# Patient Record
Sex: FEMALE | Race: WHITE
[De-identification: names, ages, dates, MRNs, and addresses within clinical notes are randomized per-mention and may not be internally consistent; named-entity substitution may affect disease eponyms.]

---

## 2018-09-13 ENCOUNTER — HOSPITAL ENCOUNTER (OUTPATIENT)
Dept: HOSPITAL 25 - OREAST | Age: 38
Discharge: HOME | End: 2018-09-13
Attending: ORTHOPAEDIC SURGERY
Payer: COMMERCIAL

## 2018-09-13 VITALS — SYSTOLIC BLOOD PRESSURE: 125 MMHG | DIASTOLIC BLOOD PRESSURE: 70 MMHG

## 2018-09-13 DIAGNOSIS — I10: ICD-10-CM

## 2018-09-13 DIAGNOSIS — E66.9: ICD-10-CM

## 2018-09-13 DIAGNOSIS — G56.21: Primary | ICD-10-CM

## 2018-09-13 DIAGNOSIS — M19.90: ICD-10-CM

## 2018-09-13 DIAGNOSIS — K21.9: ICD-10-CM

## 2018-09-13 PROCEDURE — 81025 URINE PREGNANCY TEST: CPT

## 2018-09-14 NOTE — OP
Operative Report - Blank





- Operative Report


Date of Operation: 09/14/18


Note: 





DATE OF OPERATION:  9/13/2018 - [facility]


 


YOB: 1980


 


SURGEON:  Jerman Tucker MD


 


ASSISTANT:  CARLITA Mondragon


 


ANESTHESIOLOGIST:  Dr. Armendariz


 


ANESTHESIA:  General.


 


PRE-OP DIAGNOSIS:  


1. Right cubital tunnel syndrome.


2. Right ulnar nerve compression at the wrist.


 


POST-OP DIAGNOSIS:  


1. Right cubital tunnel syndrome.


2. Right ulnar nerve compression at the wrist.


 


OPERATIVE PROCEDURE:  


1. Right cubital tunnel release with anterior transposition.


2. Right ulnar nerve decompression at the wrist.


 


INDICATIONS:  Deepa has progressive right ulnar nerve symptoms over the last 

several years. She has electrodiagnostic evidence of ulnar nerve changes at the 

elbow and clinically she has more numbness on the palmar aspect than on the 

dorsal aspect and some signs of compression at the wrist. We talked about risks 

and benefits.  She understands the risk of pain and persistent symptoms as well 

as wound problems. She wanted to proceed.


 


ESTIMATED BLOOD LOSS:  2 mL.


 


COMPLICATIONS:  None.


 


FINDINGS:  As expected.


 


DESCRIPTION OF PROCEDURE:  Ms. Reynoso was seen in the preoperative holding area.

  The correct side, site and the procedure were identified.  We came back to 

the operating room.  I anesthetized the operative area with 0.25% plain 

Marcaine. The arm was prepped and draped in usual fashion.  





The arm was exsanguinated with the Esmarch and the tourniquet was inflated to 

250 mmHg. I began by making a longitudinal incision in the typical for location 

for a carpal tunnel incision.  This was brought back in Brunner-type fashion 

across the ulnar aspect of the wrist.  Dissection was carried down and the 

ulnar neurovascular bundle was identified.  I then opened up the fascia 

overlying the entirety of Guyon's canal.  The motor branch was released and the 

subfascial layer was released as well.  Both the common digital nerve and 

proper digital nerve were seen as ulnar nerve divided distally. Everything was 

fully decompressed.  Once there had been absolutely no more compression on the 

nerve, I irrigated everything out.  The skin was closed with 4-0 nylon suture.  





I then made a curvilinear incision centered over Osbornes ligament.  

Dissection was carried down through the subcutaneous tissue taking care to 

identify and protect the medial antebrachial cutaneous nerve throughout the 

case.  I began the decompression just proximal to Osbornes ligament. I 

released the fascia overlying the nerve proximally up past the arcade of 

struthers. I then released Osbornes ligament and then superficial FCU fascia. 

I split the two head of the FCU and released the subfascial layer in its 

entirety preserving the motor branches. 





I then checked the decompression. The elbow was flexed and extended. There was 

subluxation of the nerve so I did need to transpose the nerve.  I elevated a 

full thickness flap off the flexor pronator fascia. The medial intermuscular 

septum was excised. The leading edge of the FCU tendon was excised. Step-cut 

type fascial flaps were raised off the flexor pronator fascia. The nerve was 

released of its soft tissue attachments taking care to preserve the motor 

branches to the FCU. The nerve was transposed onto the muscular bed. The two 

ends of the fascial flaps were sewn end to end with 4-0 ethibond suture to keep 

the nerve in the transposed position. Hemostasis was obtained. The subcutaneous 

tissue was reapproximated with 3-0 vicryl suture. The skin was closed with 4-0 

monocryl and steri strips. Marcaine 0.25% was infiltrated in the operative 

area. The wound was dressed with a plaster long-arm splint and the patient was 

woken up and taken to recovery in stable condition.